# Patient Record
Sex: MALE | Race: WHITE | NOT HISPANIC OR LATINO | Employment: PART TIME | RURAL
[De-identification: names, ages, dates, MRNs, and addresses within clinical notes are randomized per-mention and may not be internally consistent; named-entity substitution may affect disease eponyms.]

---
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
## 2024-01-03 NOTE — PROGRESS NOTES
YOB: 2005  Location: [facility]  Location Address: [address] [place] [address], [facility] [address], [address] [place] [address]  Location Phone: [phone]    Chief Complaint   Patient presents with    Sinus Problem       History of Present Illness  Pete Liao is a 18 y.o. male.  Pete Liao is here for follow up of ENT complaints. The patient is s/p Septoplasty and inferior turbinate reduction via Coblation on 23. Patient has nasal congestion and headache. He cannot sleep at night due to congestion. He denies epistaxis.       Past Medical History:   Diagnosis Date    Allergic rhinitis     Anxiety     Asthma     Chronic rhinosinusitis     Depression     Deviated nasal septum     Nasal septal deformity     Nasal turbinate hypertrophy     Otitis media 10/26/2017    Left ear    Snoring     Turbinate fracture     closed       Past Surgical History:   Procedure Laterality Date    NASAL FRACTURE CLOSED REDUCTION      NASAL TURBINATE REDUCTION  2016    SEPTOPLASTY  2016    SEPTOPLASTY N/A 2023    Procedure: SEPTOPLASTY;  Surgeon: Dustin Eldridge MD;  Location: [facility];  Service: ENT;  Laterality: N/A;    SINUS SURGERY  2016    TONSILLECTOMY      TYMPANOSTOMY TUBE PLACEMENT         No outpatient medications have been marked as taking for the 24 encounter (Office Visit) with Dustin Eldridge MD.       Patient has no known allergies.    Family History   Problem Relation Age of Onset    No Known Problems Mother     No Known Problems Father        Social History     Socioeconomic History    Marital status: Single   Tobacco Use    Smoking status: Never    Smokeless tobacco: Never   Vaping Use    Vaping Use: Never used   Substance and Sexual Activity    Alcohol use: No     Comment: not exposed    Drug use: No    Sexual activity: Defer       Review of Systems   Constitutional: Negative.    HENT:  Positive for congestion.    Eyes: Negative.    Respiratory: Negative.      Cardiovascular: Negative.    Gastrointestinal: Negative.    Endocrine: Negative.    Genitourinary: Negative.    Musculoskeletal: Negative.    Skin: Negative.    Allergic/Immunologic: Negative.    Neurological:  Positive for headaches.   Hematological: Negative.    Psychiatric/Behavioral: Negative.         Vitals:    01/04/24 1543   BP: 107/72   Pulse: 116   Resp: 18   Temp: 98.2 °F (36.8 °C)       Body mass index is 35.05 kg/m².    Objective     Physical Exam  Vitals reviewed.   Constitutional:       Appearance: Normal appearance.   HENT:      Head: Normocephalic.      Right Ear: External ear normal.      Left Ear: External ear normal.      Nose:      Comments: See endoscopy  Neurological:      Mental Status: He is alert.   Psychiatric:         Behavior: Behavior is cooperative.       CONSTITUTIONAL: well nourished, well-developed, alert, oriented, in no acute distress     COMMUNICATION AND VOICE: able to communicate normally, normal voice quality    HEAD: normocephalic, no lesions, atraumatic, no tenderness, no masses     FACE: appearance normal, no lesions, no tenderness, no deformities, facial motion symmetric    EYES: ocular motility normal, eyelids normal, orbits normal, no proptosis, conjunctiva normal , pupils equal, round     EARS:  Hearing: hearing to conversational voice intact bilaterally   External Ears: normal bilaterally, no lesions    NOSE:  External Nose: external nasal structure normal, no tenderness on palpation, no nasal discharge, no lesions, no evidence of trauma, nostrils patent   Intranasal exam: See endoscopy    ORAL:  Lips: upper and lower lips without lesion     NECK:  Inspection and Palpation: neck appearance normal, no masses or tenderness    CHEST/RESPIRATORY: normal respiratory effort     CARDIOVASCULAR: no cyanosis or edema     NEUROLOGICAL/PSYCHIATRIC: oriented to time, place and person, mood normal, affect appropriate, CN II-XII intact grossly   Assessment & Plan   Diagnoses and  all orders for this visit:    1. H/O nasal septoplasty (Primary)    2. Nasal congestion    Other orders  -     mupirocin (BACTROBAN) 2 % nasal ointment; Apply to the nose twice daily  Dispense: 3 g; Refill: 0  -     azelastine (ASTELIN) 0.1 % nasal spray; 2 sprays into the nostril(s) as directed by provider 2 (Two) Times a Day for 210 days. Use in each nostril as directed  Dispense: 18 mL; Refill: 6  -     fluticasone (FLONASE) 50 MCG/ACT nasal spray; 2 sprays into the nostril(s) as directed by provider Daily for 30 days. Administer 2 sprays in each nostril for each dose.  Dispense: 18.2 mL; Refill: 6      * Surgery not found *  No orders of the defined types were placed in this encounter.    Call return for problems  Continue Ocean Spray as needed  Continue Bactroban as prescribed for 2 weeks  Resume intranasal steroid spray and intranasal antihistamine spray  Follow-up in 4-6 weeks     Return in about 8 weeks (around 2/29/2024).       Patient Instructions   Call return for problems  Continue Ocean Spray as needed  Continue Bactroban as prescribed for 2 weeks  Resume intranasal steroid spray and intranasal antihistamine spray  Follow-up in 4-6 weeks

## 2024-01-04 ENCOUNTER — OFFICE VISIT (OUTPATIENT)
Dept: OTOLARYNGOLOGY | Facility: CLINIC | Age: 19
End: 2024-01-04
Payer: COMMERCIAL

## 2024-01-04 VITALS
RESPIRATION RATE: 18 BRPM | WEIGHT: 273 LBS | HEART RATE: 116 BPM | BODY MASS INDEX: 35.04 KG/M2 | HEIGHT: 74 IN | SYSTOLIC BLOOD PRESSURE: 107 MMHG | DIASTOLIC BLOOD PRESSURE: 72 MMHG | TEMPERATURE: 98.2 F

## 2024-01-04 DIAGNOSIS — R09.81 NASAL CONGESTION: ICD-10-CM

## 2024-01-04 DIAGNOSIS — Z98.890 H/O NASAL SEPTOPLASTY: Primary | ICD-10-CM

## 2024-01-04 RX ORDER — FLUTICASONE PROPIONATE 50 MCG
2 SPRAY, SUSPENSION (ML) NASAL DAILY
Qty: 18.2 ML | Refills: 6 | Status: SHIPPED | OUTPATIENT
Start: 2024-01-04 | End: 2024-02-03

## 2024-01-04 RX ORDER — AZELASTINE 1 MG/ML
2 SPRAY, METERED NASAL 2 TIMES DAILY
Qty: 18 ML | Refills: 6 | Status: SHIPPED | OUTPATIENT
Start: 2024-01-04 | End: 2024-08-01

## 2024-01-04 NOTE — PATIENT INSTRUCTIONS
Call return for problems  Continue Ocean Spray as needed  Continue Bactroban as prescribed for 2 weeks  Resume intranasal steroid spray and intranasal antihistamine spray  Follow-up in 4-6 weeks

## 2024-01-04 NOTE — PROGRESS NOTES
Procedure Note    Pete Liao    DATE OF PROCEDURE: 1/4/2024    PROCEDURE:   Bilateral Rigid Nasal Endoscopy with debridement     PREPROCEDURE DIAGNOSIS:   Chronic rhinosinusitis S/P Septoplasty  And inferior turbinate reduction via Coblation     POSTPROCEDURE DIAGNOSIS:  SAME     ANESTHESIA:   None       Procedure Details:    After topical anesthesia and decongestion, the patient was placed in the sitting position.  An endoscope was passed along the left nasal floor to the nasopharynx.  It was then passed into the region of the middle meatus, middle turbinate, and the sphenoethmoid region. An identical procedure was performed on the right side.  The following findings were noted as stated below:    Findings: Bilateral mucoid debris and crusting the inferior turbinates removed with suction debrider without difficulty.  The patient to breathe much better postoperatively.  He tolerated this well without bleeding.    Condition:  Stable.  Patient tolerated procedure well.    Complications:  None

## 2024-01-10 ENCOUNTER — HOSPITAL ENCOUNTER (OUTPATIENT)
Dept: SLEEP CENTER | Age: 19
Discharge: HOME | End: 2024-01-12
Payer: COMMERCIAL

## 2024-01-10 PROCEDURE — 95810 POLYSOM 6/> YRS 4/> PARAM: CPT

## 2024-01-11 NOTE — PROGRESS NOTES
[facility]  [address] [place] [address]  [address]  Phone [phone] Fax [phone]     Sleep Study Technician Review    Patient Name:  Christian Smart  :   2005  Referring Provider: Abigail Bray APRN *    Brief History:  Christian Smart is a 18 y.o. male with a history of Asthma, EDS, Headaches/Migraines and snoring who has been referred for a sleep study. Pt has a deviated nasal septum due to fracture. Pt does snore and wakes with morning headaches.     [facility] Fall Risk Assessment  Have you fallen in the past year? YES[] NO[x]  Do you feel unsteady when standing or walking? YES[] NO[x]  Are you worried about falling? YES[] NO[x]   aFall Risk screening requirement has been met  Not at risk for falls.    Height:   6\" 2\"  Weight:  269 lbs  BMI: 34.5  Neck Circ: 17\"  Mallampati 4  ESS:  15    Type of Study: PSG  Time Stage Position Snore Hypopnea Obs Apnea Harjit Apnea PAP O2   2200 Awake Supine No No No No  RA   2300 2 Right No No No No  RA   2400 2 Right No No No No  RA   0100 2 Supine No No No No  RA   0200 Awake Left No No No No  RA   0300 Awake Right No No No No  RA   0400 Awake Left No No No No  RA                         Summary:  Pt stated that he has a hard time getting any sleep. Pt stated that he gets around 2-3 hours a night. Pt stated that he does snore. Pt stated that he just had nasal surgery several weeks ago. Pt stated that he had a deviated nasal septum. He also stated that he still had a surgery to go. Pt awoke several times throughout night. Pt had a difficult time in am and decided to end sleep study at 0400 due to able to get back to sleep.    DME: [facility]     The study was reviewed briefly with Christian Smart.  He will be notified of the formal results and recommendations after the study is scored and interpreted.  The report will be sent to his referring provider.    Technician:  BRIE Medina

## 2024-03-18 NOTE — PROGRESS NOTES
YOB: 2005  Location: [facility]  Location Address: [address] [place] [address], [facility] [address], [address] [place] [address]  Location Phone: [phone]    Chief Complaint   Patient presents with    Follow-up     sinuses       History of Present Illness  Pete Liao is a 19 y.o. male.  Pete Liao is here for follow up of ENT complaints. The patient is s/p septoplasty and inferior turbinate reduction via Coblation on 23.  He complains of nasal congestion left > right. He has been using astelin, flonase, and mupirocin nasal ointment as directed.     AHI: 8.9 on 1/10/2024    Past Medical History:   Diagnosis Date    Allergic rhinitis     Anxiety     Asthma     Chronic rhinosinusitis     Depression     Deviated nasal septum     Nasal septal deformity     Nasal turbinate hypertrophy     Otitis media 10/26/2017    Left ear    Snoring     Turbinate fracture     closed       Past Surgical History:   Procedure Laterality Date    NASAL FRACTURE CLOSED REDUCTION      NASAL TURBINATE REDUCTION  2016    SEPTOPLASTY  2016    SEPTOPLASTY N/A 2023    Procedure: SEPTOPLASTY;  Surgeon: Dustin Eldridge MD;  Location: [facility];  Service: ENT;  Laterality: N/A;    SINUS SURGERY  2016    TONSILLECTOMY      TYMPANOSTOMY TUBE PLACEMENT         Outpatient Medications Marked as Taking for the 3/19/24 encounter (Office Visit) with Dustin Eldridge MD   Medication Sig Dispense Refill    PARoxetine (PAXIL) 20 MG tablet TAKE ONE TABLET EVERY MORNING GENERIC FOR PAXIL 30 tablet 5       Patient has no known allergies.    Family History   Problem Relation Age of Onset    No Known Problems Mother     No Known Problems Father        Social History     Socioeconomic History    Marital status: Single   Tobacco Use    Smoking status: Never    Smokeless tobacco: Never   Vaping Use    Vaping status: Never Used   Substance and Sexual Activity    Alcohol use: No     Comment: not exposed    Drug use: No     Sexual activity: Defer       Review of Systems   Constitutional: Negative.    HENT:  Positive for congestion.        Vitals:    03/19/24 1301   BP: 140/84   Pulse: 97   Temp: 98.3 °F (36.8 °C)       Body mass index is 36.21 kg/m².    Objective     Physical Exam  Vitals reviewed.   Constitutional:       Appearance: He is obese.   HENT:      Head: Normocephalic.      Right Ear: Tympanic membrane, ear canal and external ear normal.      Left Ear: Tympanic membrane, ear canal and external ear normal.      Nose:      Comments: Crusting noted to the left nostril     Mouth/Throat:      Lips: Pink.      Mouth: Mucous membranes are moist.      Comments: Krueger III   Tonsils surgically absent     Neurological:      Mental Status: He is alert.   Psychiatric:         Behavior: Behavior is cooperative.         Assessment & Plan   Diagnoses and all orders for this visit:    1. H/O nasal septoplasty (Primary)    2. Nasal congestion    3. Snoring    Other orders  -     amoxicillin (AMOXIL) 500 MG capsule; Take 1 capsule by mouth 3 (Three) Times a Day for 7 days.  Dispense: 21 capsule; Refill: 0      * Surgery not found *  No orders of the defined types were placed in this encounter.    Continue nasal sprays and nasal ointment  Amoxicillin as directed  Return for problems    Dr. Eldridge examined and discussed care with patient and agrees with treatment plan.     Return in about 3 months (around 6/19/2024) for Recheck.       Patient Instructions   Continue nasal sprays and nasal ointment  Amoxicillin as directed  Return for problems    CONTACT INFORMATION:  The main office phone number is [phone]. For emergencies after hours and on weekends, this number will convert over to our answering service and the on call provider will answer. Please try to keep non emergent phone calls/ questions to office hours 9am-5pm Monday through Friday.      [employer]  As an alternative, you can sign up and use the Epic MyChart system for more direct  and quicker access for non emergent questions/ problems.  [employer] allows you to send messages to your doctor, view your test results, renew your prescriptions, schedule appointments, and more. To sign up, go to [employer] and click on the Sign Up Now link in the New User? box. Enter your SEDLine Activation Code exactly as it appears below along with the last four digits of your Social Security Number and your Date of Birth () to complete the sign-up process. If you do not sign up before the expiration date, you must request a new code.     SEDLine Activation Code: Activation code not generated  Current SEDLine Status: Active     If you have questions, you can email [email or web address] or call [phone] to talk to our SEDLine staff. Remember, SEDLine is NOT to be used for urgent needs. For medical emergencies, dial 911.     IF YOU SMOKE OR USE TOBACCO PLEASE READ THE FOLLOWING:  Why is smoking bad for me?  Smoking increases the risk of heart disease, lung disease, vascular disease, stroke, and cancer. If you smoke, STOP!        IF YOU SMOKE OR USE TOBACCO PLEASE READ THE FOLLOWING:  Why is smoking bad for me?  Smoking increases the risk of heart disease, lung disease, vascular disease, stroke, and cancer. If you smoke, STOP!     For more information:  Quit Now [facility]  -QUIT-NOW  [email or web address]

## 2024-03-19 ENCOUNTER — OFFICE VISIT (OUTPATIENT)
Dept: OTOLARYNGOLOGY | Facility: CLINIC | Age: 19
End: 2024-03-19
Payer: COMMERCIAL

## 2024-03-19 VITALS
HEIGHT: 74 IN | HEART RATE: 97 BPM | WEIGHT: 282 LBS | DIASTOLIC BLOOD PRESSURE: 84 MMHG | BODY MASS INDEX: 36.19 KG/M2 | SYSTOLIC BLOOD PRESSURE: 140 MMHG | TEMPERATURE: 98.3 F

## 2024-03-19 DIAGNOSIS — R06.83 SNORING: ICD-10-CM

## 2024-03-19 DIAGNOSIS — Z98.890 H/O NASAL SEPTOPLASTY: Primary | ICD-10-CM

## 2024-03-19 DIAGNOSIS — R09.81 NASAL CONGESTION: ICD-10-CM

## 2024-03-19 RX ORDER — AMOXICILLIN 500 MG/1
500 CAPSULE ORAL 3 TIMES DAILY
Qty: 21 CAPSULE | Refills: 0 | Status: SHIPPED | OUTPATIENT
Start: 2024-03-19 | End: 2024-03-26

## 2024-03-19 NOTE — PATIENT INSTRUCTIONS
Continue nasal sprays and nasal ointment  Amoxicillin as directed  Return for problems    CONTACT INFORMATION:  The main office phone number is [phone]. For emergencies after hours and on weekends, this number will convert over to our answering service and the on call provider will answer. Please try to keep non emergent phone calls/ questions to office hours 9am-5pm Monday through Friday.      [employer]  As an alternative, you can sign up and use the Epic MyChart system for more direct and quicker access for non emergent questions/ problems.  [employer] allows you to send messages to your doctor, view your test results, renew your prescriptions, schedule appointments, and more. To sign up, go to [employer] and click on the Sign Up Now link in the New User? box. Enter your [employer] Activation Code exactly as it appears below along with the last four digits of your Social Security Number and your Date of Birth () to complete the sign-up process. If you do not sign up before the expiration date, you must request a new code.     [employer] Activation Code: Activation code not generated  Current [employer] Status: Active     If you have questions, you can email [email or web address] or call [phone] to talk to our [employer] staff. Remember, [employer] is NOT to be used for urgent needs. For medical emergencies, dial 911.     IF YOU SMOKE OR USE TOBACCO PLEASE READ THE FOLLOWING:  Why is smoking bad for me?  Smoking increases the risk of heart disease, lung disease, vascular disease, stroke, and cancer. If you smoke, STOP!        IF YOU SMOKE OR USE TOBACCO PLEASE READ THE FOLLOWING:  Why is smoking bad for me?  Smoking increases the risk of heart disease, lung disease, vascular disease, stroke, and cancer. If you smoke, STOP!     For more information:  Quit Now [facility]  -QUIT-NOW  [email or web address]

## 2024-03-19 NOTE — PROGRESS NOTES
PROCEDURE NOTE    Pete Liao    DATE OF PROCEDURE: 3/19/2024    PROCEDURE:   Bilateral Diagnostic Rigid Nasal Endoscopy    PREPROCEDURE DIAGNOSIS:   S/P Septoplasty with congestion    POSTPROCEDURE DIAGNOSIS:  SAME    ANESTHESIA:   None    PROCEDURE DESCRIPTION:    With the patient in the chair bilateral diagnostic rigid nasal endoscopy was performed with the Stortz 0° endoscope without difficulty.     An endoscope was passed along the left nasal floor to the nasopharynx.  It was then passed into the region of the middle meatus, middle turbinate, and the sphenoethmoid region. An identical procedure was performed on the right side.  The following findings were noted as stated below:    Findings: Moderate crusting on the left with mild residual caudal deviation.  Crusting was removed with bayonet forceps and relatively good air movement noted bilaterally.    Condition:  Stable.  Patient tolerated procedure well.    Complications:  None  There was no significant bleeding.

## 2024-03-26 ENCOUNTER — OFFICE VISIT (OUTPATIENT)
Dept: FAMILY MEDICINE CLINIC | Facility: CLINIC | Age: 19
End: 2024-03-26
Payer: COMMERCIAL

## 2024-03-26 VITALS
DIASTOLIC BLOOD PRESSURE: 76 MMHG | WEIGHT: 278 LBS | HEIGHT: 74 IN | SYSTOLIC BLOOD PRESSURE: 115 MMHG | OXYGEN SATURATION: 98 % | HEART RATE: 60 BPM | BODY MASS INDEX: 35.68 KG/M2

## 2024-03-26 DIAGNOSIS — E29.1 TESTOSTERONE DEFICIENCY IN MALE: ICD-10-CM

## 2024-03-26 DIAGNOSIS — G47.01 INSOMNIA DUE TO MEDICAL CONDITION: Primary | ICD-10-CM

## 2024-03-26 DIAGNOSIS — F39 MOOD DISORDER: ICD-10-CM

## 2024-03-26 PROBLEM — G47.00 INSOMNIA DISORDER RELATED TO KNOWN ORGANIC FACTOR: Status: ACTIVE | Noted: 2024-03-26

## 2024-03-26 PROCEDURE — 99213 OFFICE O/P EST LOW 20 MIN: CPT | Performed by: PEDIATRICS

## 2024-03-26 RX ORDER — LAMOTRIGINE 25 MG/1
TABLET ORAL
Qty: 42 TABLET | Refills: 0 | Status: SHIPPED | OUTPATIENT
Start: 2024-03-26 | End: 2024-04-23

## 2024-03-26 RX ORDER — QUETIAPINE FUMARATE 50 MG/1
50 TABLET, FILM COATED ORAL NIGHTLY
Qty: 30 TABLET | Refills: 0 | Status: SHIPPED | OUTPATIENT
Start: 2024-03-26

## 2024-03-26 NOTE — ASSESSMENT & PLAN NOTE
Psychological condition is newly identified.  Medication changes per orders.  Will start low dose lamictal  discussed side effects denise rash  Psychological condition  will be reassessed in 4 weeks.

## 2024-03-26 NOTE — PROGRESS NOTES
"Chief Complaint  Insomnia and Testosterone Labs    Subjective    History of Present Illness      Patient presents to [facility] for   History of Present Illness  Pt is here today with c/o fatigue, increased irritability. He was previously dx with low testosterone and treated for this with a supplement.   Insomnia occurring most nights. Sleep study completed with no findings.     Also notices very short fuse with his mood and irritability       Review of Systems    I have reviewed and agree with the HPI information as above.  Aldair Arias MD     Objective   Vital Signs:   /76   Pulse 60   Ht 188 cm (74\")   Wt 126 kg (278 lb)   SpO2 98%   BMI 35.69 kg/m²            Physical Exam  Constitutional:       Appearance: Normal appearance. He is normal weight.   Cardiovascular:      Rate and Rhythm: Normal rate and regular rhythm.      Heart sounds: Normal heart sounds.   Pulmonary:      Effort: Pulmonary effort is normal.      Breath sounds: Normal breath sounds.   Neurological:      Mental Status: He is alert.   Psychiatric:         Mood and Affect: Mood normal.         Behavior: Behavior normal.             Result Review  Data Reviewed:                   Assessment and Plan      Diagnoses and all orders for this visit:    1. Insomnia due to medical condition (Primary)  Assessment & Plan:  Mind racing insomnia  will try round of seroquel 50   recheck in a month    Orders:  -     QUEtiapine (SEROquel) 50 MG tablet; Take 1 tablet by mouth Every Night.  Dispense: 30 tablet; Refill: 0    2. Testosterone deficiency in male  Assessment & Plan:  Was treated due to delayed puberty at [facility] endocrine clinic   he has had an amazing growth   not on any more but will check early am lab to make sure he is ok off testosterone   been of 3-4 years    Orders:  -     FSH & LH; Future  -     Testosterone, Free, Total; Future    3. Mood disorder  Assessment & Plan:  Psychological condition " is newly identified.  Medication changes per orders.  Will start low dose lamictal  discussed side effects denise rash  Psychological condition  will be reassessed in 4 weeks.    Orders:  -     lamoTRIgine (LaMICtal) 25 MG tablet; Take 1 tablet by mouth Every Night for 14 days, THEN 2 tablets Every Night for 14 days.  Dispense: 42 tablet; Refill: 0            Follow Up   Return in about 4 weeks (around 4/23/2024) for Recheck.  Patient was given instructions and counseling regarding his condition or for health maintenance advice. Please see specific information pulled into the AVS if appropriate.

## 2024-03-26 NOTE — ASSESSMENT & PLAN NOTE
Was treated due to delayed puberty at [facility] endocrine clinic   he has had an amazing growth   not on any more but will check early am lab to make sure he is ok off testosterone   been of 3-4 years

## 2024-03-27 ENCOUNTER — LAB (OUTPATIENT)
Dept: LAB | Facility: HOSPITAL | Age: 19
End: 2024-03-27
Payer: COMMERCIAL

## 2024-03-27 DIAGNOSIS — E29.1 TESTOSTERONE DEFICIENCY IN MALE: ICD-10-CM

## 2024-03-27 LAB
FSH SERPL-ACNC: 2.78 MIU/ML
LH SERPL-ACNC: 4.53 MIU/ML

## 2024-03-27 PROCEDURE — 84402 ASSAY OF FREE TESTOSTERONE: CPT

## 2024-03-27 PROCEDURE — 83002 ASSAY OF GONADOTROPIN (LH): CPT

## 2024-03-27 PROCEDURE — 84403 ASSAY OF TOTAL TESTOSTERONE: CPT

## 2024-03-27 PROCEDURE — 36415 COLL VENOUS BLD VENIPUNCTURE: CPT

## 2024-03-27 PROCEDURE — 83001 ASSAY OF GONADOTROPIN (FSH): CPT

## 2024-03-29 LAB
TESTOST FREE SERPL-MCNC: 7.3 PG/ML (ref 9.3–26.5)
TESTOST SERPL-MCNC: 369 NG/DL (ref 264–916)

## 2024-04-02 ENCOUNTER — TELEPHONE (OUTPATIENT)
Dept: FAMILY MEDICINE CLINIC | Facility: CLINIC | Age: 19
End: 2024-04-02
Payer: COMMERCIAL

## 2024-04-02 NOTE — TELEPHONE ENCOUNTER
----- Message from Aldair Arias MD sent at 4/1/2024  4:00 PM CDT -----  Total testos is normal   free is a little low    none needed unless any symptoms of low t

## 2024-04-02 NOTE — TELEPHONE ENCOUNTER
Sent pt KIHEITAI message relaying below    HUB TO RELAY  Your total testosterone was normal, the free testosterone was slightly low. Dr. Arias said you shouldn't need replacement therapy unless you are symptomatic. Please let me know if you have any questions.

## 2024-05-30 DIAGNOSIS — G47.01 INSOMNIA DUE TO MEDICAL CONDITION: ICD-10-CM

## 2024-05-30 RX ORDER — QUETIAPINE FUMARATE 50 MG/1
50 TABLET, FILM COATED ORAL NIGHTLY
Qty: 30 TABLET | Refills: 0 | OUTPATIENT
Start: 2024-05-30

## 2024-06-07 DIAGNOSIS — F41.8 ANXIETY WITH DEPRESSION: ICD-10-CM

## 2024-06-07 RX ORDER — PAROXETINE HYDROCHLORIDE 20 MG/1
TABLET, FILM COATED ORAL
Qty: 30 TABLET | Refills: 5 | Status: SHIPPED | OUTPATIENT
Start: 2024-06-07

## 2024-06-11 NOTE — DISCHARGE SUMMARY
"[facility]   DISCHARGE SUMMARY       Date of Admission: 10/27/2017  Date of Discharge:  10/29/2017  Primary Care Physician: Tray Yeboah MD    Presenting Problem/History of Present Illness:  Otitis externa [H60.90]     Final Discharge Diagnoses:  Hospital Problem List     Otitis externa          Consults: dr Fuchs    Procedures Performed: none     Pertinent Test Results: none    Chief Complaint on Day of Discharge: ear pain resolved--sleeping well    History of Present Illness on Day of Discharge: recent ear pain has resolved--no otorrhea or fever     Hospital Course:  The patient is a 12 y.o. male who presented to [facility] with persistent left otalgia and temp 101 despite use of oral antbx, ear drops and analgesics--admitted to observation, kindly seen by the ent team, iv antbx, steroids and ear drops--his ear pain resolved, cleared by ent for discharge    Condition on Discharge:  good    Physical Exam on Discharge:  BP (!) 106/72 (BP Location: Left arm, Patient Position: Lying)  Pulse 75  Temp 97.8 °F (36.6 °C) (Oral)   Resp 18  Ht 62.01\" (157.5 cm)  Wt 149 lb 8 oz (67.8 kg)  SpO2 97%  BMI 27.34 kg/m2  Physical Exam   Constitutional: He appears well-developed and well-nourished.   HENT:   Head: Normocephalic and atraumatic.   Right Ear: External ear normal.   Nose: Nose normal.   Mouth/Throat: Oropharynx is clear and moist.   Left ext canal resolving erythema and tenderness   Eyes: Conjunctivae and EOM are normal. Pupils are equal, round, and reactive to light.   Cardiovascular: Normal rate.    Pulmonary/Chest: Effort normal.   Nursing note and vitals reviewed.        Discharge Disposition:  Home or Self Care    Discharge Medications:   Pete Liao   Home Medication Instructions [id number]    Printed on:10/29/17 0659   Medication Information                      amoxicillin-clavulanate (AUGMENTIN) 500-125 MG per tablet  Take 1 tablet by mouth 2 (Two) Times a " I called the daughter, she says pt is taking this for arthritis, shoulder pain, leg pain. She usually takes 2-3 a day and she will be out today.     I called the pharmacy too. She last picked this up on 4/29/24 #90 take one TID   Day.             ciprofloxacin-dexamethasone (CIPRODEX) 0.3-0.1 % otic suspension  Administer 4 drops into the left ear Every 12 (Twelve) Hours.             HYDROcodone-acetaminophen (NORCO) 5-325 MG per tablet  Take 1 tablet by mouth Every 4 (Four) Hours As Needed for Moderate Pain .             Loratadine (CLARITIN) 10 MG capsule  Take  by mouth As Needed.             Triamcinolone Acetonide (NASACORT AQ) 55 MCG/ACT nasal inhaler  2 sprays into each nostril Daily.                 Discharge Diet: as tolerated    Activity at Discharge: as tolerated    Discharge Care Plan/Instructions: dr yeboah one week    Follow-up Appointments:   Future Appointments  Date Time Provider Department Center   7/13/2018 9:45 AM [facility] None       Test Results Pending at Discharge: none    Tray Yeboah MD  10/29/17  6:59 AM    Time: 7:01am

## 2024-08-27 ENCOUNTER — OFFICE VISIT (OUTPATIENT)
Dept: FAMILY MEDICINE CLINIC | Facility: CLINIC | Age: 19
End: 2024-08-27
Payer: COMMERCIAL

## 2024-08-27 ENCOUNTER — HOSPITAL ENCOUNTER (OUTPATIENT)
Dept: GENERAL RADIOLOGY | Facility: HOSPITAL | Age: 19
Discharge: HOME | End: 2024-08-27
Admitting: PEDIATRICS
Payer: OTHER MISCELLANEOUS

## 2024-08-27 VITALS
HEART RATE: 69 BPM | OXYGEN SATURATION: 98 % | SYSTOLIC BLOOD PRESSURE: 124 MMHG | WEIGHT: 275 LBS | DIASTOLIC BLOOD PRESSURE: 85 MMHG | BODY MASS INDEX: 35.29 KG/M2 | HEIGHT: 74 IN

## 2024-08-27 DIAGNOSIS — S46.911A ELBOW STRAIN, RIGHT, INITIAL ENCOUNTER: Primary | ICD-10-CM

## 2024-08-27 PROCEDURE — 73090 X-RAY EXAM OF FOREARM: CPT

## 2024-08-27 PROCEDURE — 73070 X-RAY EXAM OF ELBOW: CPT

## 2024-08-27 PROCEDURE — 99213 OFFICE O/P EST LOW 20 MIN: CPT | Performed by: PEDIATRICS

## 2024-08-27 NOTE — PROGRESS NOTES
"Chief Complaint  Arm Injury (right)    Subjective    History of Present Illness      Patient presents to [facility] for   History of Present Illness  Pt is here today with c/o right arm injury and pain, mainly in elbow that occurred ~7:45am. Pain shoots into forearm. Some swelling occurring.  This occurred after slipping on coal and attempting to catch himself. He feels he hyperextended the limb, unsure if he fell on it.        Review of Systems    I have reviewed and agree with the HPI information as above.  Aldair Arias MD     Objective   Vital Signs:   /85   Pulse 69   Ht 188 cm (74\")   Wt 125 kg (275 lb)   SpO2 98%   BMI 35.31 kg/m²     Class 2 Severe Obesity (BMI >=35 and <=39.9). Obesity-related health conditions include the following: none. Obesity is unchanged. BMI is is above average; BMI management plan is completed. We discussed portion control, increasing exercise, and Information on healthy weight added to patient's after visit summary.      Physical Exam  Constitutional:       Appearance: Normal appearance. He is normal weight.   Cardiovascular:      Rate and Rhythm: Normal rate and regular rhythm.      Heart sounds: Normal heart sounds.   Pulmonary:      Effort: Pulmonary effort is normal.      Breath sounds: Normal breath sounds.   Musculoskeletal:      Right elbow: Swelling present. Decreased range of motion. Tenderness present.        Arms:    Neurological:      Mental Status: He is alert.   Psychiatric:         Mood and Affect: Mood normal.         Behavior: Behavior normal.               Result Review  Data Reviewed:     XR Forearm 2 View Right (In Office) (08/27/2024 10:09)  XR Elbow 2 View Right (In Office) (08/27/2024 10:09)              Assessment and Plan      Diagnoses and all orders for this visit:    1. Elbow strain, right, initial encounter (Primary)  -     XR Forearm 2 View Right (In Office)  -     XR Elbow 2 View Left (In Office)  -     XR " Elbow 2 View Right (In Office)            Follow Up   Return if symptoms worsen or fail to improve.  Patient was given instructions and counseling regarding his condition or for health maintenance advice. Please see specific information pulled into the AVS if appropriate.

## 2024-08-28 ENCOUNTER — TELEPHONE (OUTPATIENT)
Dept: FAMILY MEDICINE CLINIC | Facility: CLINIC | Age: 19
End: 2024-08-28
Payer: COMMERCIAL

## 2024-09-25 ENCOUNTER — OFFICE VISIT (OUTPATIENT)
Dept: FAMILY MEDICINE CLINIC | Facility: CLINIC | Age: 19
End: 2024-09-25
Payer: COMMERCIAL

## 2024-09-25 VITALS
BODY MASS INDEX: 36.06 KG/M2 | HEART RATE: 52 BPM | DIASTOLIC BLOOD PRESSURE: 79 MMHG | OXYGEN SATURATION: 98 % | SYSTOLIC BLOOD PRESSURE: 126 MMHG | WEIGHT: 281 LBS | HEIGHT: 74 IN

## 2024-09-25 DIAGNOSIS — F31.81 BIPOLAR 2 DISORDER, MAJOR DEPRESSIVE EPISODE: Primary | ICD-10-CM

## 2024-09-25 PROCEDURE — 99214 OFFICE O/P EST MOD 30 MIN: CPT | Performed by: PEDIATRICS

## 2024-09-25 RX ORDER — LUMATEPERONE 42 MG/1
1 CAPSULE ORAL DAILY
Qty: 30 CAPSULE | Refills: 2 | Status: SHIPPED | OUTPATIENT
Start: 2024-09-25

## (undated) DEVICE — GLV SURG BIOGEL M LTX PF 7 1/2

## (undated) DEVICE — 4-PORT MANIFOLD: Brand: NEPTUNE 2

## (undated) DEVICE — ELECTRD BLD EZ CLN MOD XLNG 2.75IN

## (undated) DEVICE — CONTAINER,SPECIMEN,OR STERILE,4OZ: Brand: MEDLINE

## (undated) DEVICE — PK ENT HD AND NK 30

## (undated) DEVICE — HDRST POSITIONING FM RND 2X9IN

## (undated) DEVICE — SUT GUT PLN 4/0 P3 18IN 1644G

## (undated) DEVICE — TRAP FLD MINIVAC MEGADYNE 100ML

## (undated) DEVICE — SPONGE,NEURO,0.5"X3",XR,STRL,LF,10/PK: Brand: MEDLINE

## (undated) DEVICE — REFLEX ULTRA 45 WITH INTEGRATED CABLE: Brand: COBLATION